# Patient Record
Sex: FEMALE | Race: WHITE | URBAN - METROPOLITAN AREA
[De-identification: names, ages, dates, MRNs, and addresses within clinical notes are randomized per-mention and may not be internally consistent; named-entity substitution may affect disease eponyms.]

---

## 2024-03-04 ENCOUNTER — APPOINTMENT (RX ONLY)
Dept: URBAN - METROPOLITAN AREA CLINIC 84 | Facility: CLINIC | Age: 69
Setting detail: DERMATOLOGY
End: 2024-03-04

## 2024-03-04 DIAGNOSIS — D485 NEOPLASM OF UNCERTAIN BEHAVIOR OF SKIN: ICD-10-CM

## 2024-03-04 DIAGNOSIS — L21.8 OTHER SEBORRHEIC DERMATITIS: ICD-10-CM

## 2024-03-04 PROBLEM — D48.5 NEOPLASM OF UNCERTAIN BEHAVIOR OF SKIN: Status: ACTIVE | Noted: 2024-03-04

## 2024-03-04 PROCEDURE — ? PRESCRIPTION

## 2024-03-04 PROCEDURE — ? ADDITIONAL NOTES

## 2024-03-04 PROCEDURE — ? PRESCRIPTION MEDICATION MANAGEMENT

## 2024-03-04 PROCEDURE — ? COUNSELING

## 2024-03-04 PROCEDURE — 99204 OFFICE O/P NEW MOD 45 MIN: CPT | Mod: 25

## 2024-03-04 PROCEDURE — 11102 TANGNTL BX SKIN SINGLE LES: CPT

## 2024-03-04 PROCEDURE — ? BIOPSY BY SHAVE METHOD

## 2024-03-04 RX ORDER — CLOBETASOL PROPIONATE 0.5 MG/G
CREAM TOPICAL
Qty: 45 | Refills: 4 | Status: ERX | COMMUNITY
Start: 2024-03-04

## 2024-03-04 RX ADMIN — CLOBETASOL PROPIONATE: 0.5 CREAM TOPICAL at 00:00

## 2024-03-04 ASSESSMENT — LOCATION DETAILED DESCRIPTION DERM: LOCATION DETAILED: LEFT SUPERIOR PARIETAL SCALP

## 2024-03-04 ASSESSMENT — LOCATION SIMPLE DESCRIPTION DERM: LOCATION SIMPLE: SCALP

## 2024-03-04 ASSESSMENT — LOCATION ZONE DERM: LOCATION ZONE: SCALP

## 2024-03-04 NOTE — PROCEDURE: BIOPSY BY SHAVE METHOD
Detail Level: Detailed
Depth Of Biopsy: dermis
Was A Bandage Applied: Yes
Size Of Lesion In Cm: 0.8
X Size Of Lesion In Cm: 0
Biopsy Type: H and E
Biopsy Method: Dermablade
Anesthesia Type: 1% lidocaine with epinephrine
Anesthesia Volume In Cc: 0.5
Hemostasis: Drysol
Wound Care: Petrolatum
Dressing: bandage
normal
Destruction After The Procedure: No
Type Of Destruction Used: Curettage
Curettage Text: The wound bed was treated with curettage after the biopsy was performed.
Cryotherapy Text: The wound bed was treated with cryotherapy after the biopsy was performed.
Electrodesiccation Text: The wound bed was treated with electrodesiccation after the biopsy was performed.
Electrodesiccation And Curettage Text: The wound bed was treated with electrodesiccation and curettage after the biopsy was performed.
Silver Nitrate Text: The wound bed was treated with silver nitrate after the biopsy was performed.
Lab: [id number]
Consent: Written consent was obtained and risks were reviewed including but not limited to scarring, infection, bleeding, scabbing, incomplete removal, nerve damage and allergy to anesthesia.
Post-Care Instructions: I reviewed with the patient in detail post-care instructions. Patient is to keep the biopsy site dry overnight, and then apply bacitracin twice daily until healed. Patient may apply hydrogen peroxide soaks to remove any crusting.
Notification Instructions: Patient will be notified of biopsy results. However, patient instructed to call the office if not contacted within 2 weeks.
Billing Type: Third-Party Bill
Information: Selecting Yes will display possible errors in your note based on the variables you have selected. This validation is only offered as a suggestion for you. PLEASE NOTE THAT THE VALIDATION TEXT WILL BE REMOVED WHEN YOU FINALIZE YOUR NOTE. IF YOU WANT TO FAX A PRELIMINARY NOTE YOU WILL NEED TO TOGGLE THIS TO 'NO' IF YOU DO NOT WANT IT IN YOUR FAXED NOTE.

## 2024-03-04 NOTE — PROCEDURE: PRESCRIPTION MEDICATION MANAGEMENT
Plan: Continue the use of vanicream anti dandruff shampoo
Detail Level: Zone
Initiate Treatment: Clobetasol cream, twice daily for two weeks patient to use [facility]
Render In Strict Bullet Format?: No

## 2024-03-04 NOTE — HPI: BODY LOCATION - SCALP
How Severe Is Your Condition?: moderate
Additional History: Patient has tried and failed vanicream, ketoconazole,clobetasol, coal tar , psoriasis shampoo

## 2024-03-04 NOTE — PROCEDURE: ADDITIONAL NOTES
Render Risk Assessment In Note?: no
Detail Level: Simple
Additional Notes: Patient has tried and failed ketoconazole clobetasol solution, doxycycline,vanicream,coal tar shampoo, selsum blue\\n\\nAdvised concerns have been present off and on for  multiple years \\nPatient to stop picking lesions \\nInjections in the past, offered today and declined TAC injections

## 2024-04-22 ENCOUNTER — APPOINTMENT (RX ONLY)
Dept: URBAN - METROPOLITAN AREA CLINIC 84 | Facility: CLINIC | Age: 69
Setting detail: DERMATOLOGY
End: 2024-04-22

## 2024-04-22 DIAGNOSIS — L21.8 OTHER SEBORRHEIC DERMATITIS: ICD-10-CM

## 2024-04-22 PROCEDURE — ? PRESCRIPTION MEDICATION MANAGEMENT

## 2024-04-22 PROCEDURE — ? ADDITIONAL NOTES

## 2024-04-22 PROCEDURE — ? PRESCRIPTION

## 2024-04-22 PROCEDURE — ? COUNSELING

## 2024-04-22 PROCEDURE — 99214 OFFICE O/P EST MOD 30 MIN: CPT

## 2024-04-22 RX ORDER — KETOCONAZOLE 20 MG/ML
SHAMPOO, SUSPENSION TOPICAL BIW
Qty: 120 | Refills: 11 | Status: ERX | COMMUNITY
Start: 2024-04-22

## 2024-04-22 RX ORDER — CLOBETASOL PROPIONATE 0.5 MG/G
CREAM TOPICAL
Qty: 60 | Refills: 0 | Status: ERX

## 2024-04-22 RX ORDER — FLUCONAZOLE 100 MG/1
TABLET ORAL
Qty: 14 | Refills: 0 | Status: ERX | COMMUNITY
Start: 2024-04-22

## 2024-04-22 RX ADMIN — KETOCONAZOLE: 20 SHAMPOO, SUSPENSION TOPICAL at 00:00

## 2024-04-22 RX ADMIN — FLUCONAZOLE: 100 TABLET ORAL at 00:00

## 2024-04-22 NOTE — PROCEDURE: ADDITIONAL NOTES
Render Risk Assessment In Note?: no
Detail Level: Simple
Additional Notes: 3/4/24 Patient has tried and failed ketoconazole clobetasol solution, doxycycline,vanicream,coal tar shampoo, selsum blue\\n\\nAdvised concerns have been present off and on for  multiple years \\nPatient to stop picking lesions \\nInjections in the past, offered today and declined TAC injections\\n\\n\\n4/22/24 pt stated that she will contact her pharmacist to discuss how long does the amtriplyine stays in her system. Pt stated at todays office visit she will not take the prescription to until her pharmacist okayed it.\\n\\n\\nPatient stated that she was not going to take her\\n\\n\\nPatient acknowledges understanding that we cannot prescribe fluconazole without her stopping amitriptyline and diclofenac. Patient stated that she has not taken any of those two medication's in about a week and will not take any of those medication's for the upcoming month in order to finish her 2 week regimen of the fluconazole to treat seborrheic dermatitis. We explain to the patient that they were contraindications of possible seizure if she mixes those two medication's that were explain to her, patient acknowledged understanding, and agreed to discontinue both for the foreseeable future. Patient acknowledges understanding that we cannot prescribe fluconazole without her stopping amitriptyline and diclofenac. Patient stated that she has not taken any of those two medication's in about a week and will not take any of those medication's for the upcoming month in order to finish her 2 week regimen of the fluconazole to treat seborrheic dermatitis. We explain to the patient that they were contraindications of possible seizure if she mixes those two medication's that were explain to her, patient acknowledged understanding, and agreed to discontinue both for the foreseeable future.

## 2024-06-03 ENCOUNTER — APPOINTMENT (RX ONLY)
Dept: URBAN - METROPOLITAN AREA CLINIC 84 | Facility: CLINIC | Age: 69
Setting detail: DERMATOLOGY
End: 2024-06-03

## 2024-06-03 DIAGNOSIS — L21.8 OTHER SEBORRHEIC DERMATITIS: ICD-10-CM

## 2024-06-03 PROCEDURE — ? ADDITIONAL NOTES

## 2024-06-03 PROCEDURE — 99214 OFFICE O/P EST MOD 30 MIN: CPT

## 2024-06-03 PROCEDURE — ? PRESCRIPTION

## 2024-06-03 PROCEDURE — ? COUNSELING

## 2024-06-03 PROCEDURE — ? PRESCRIPTION MEDICATION MANAGEMENT

## 2024-06-03 RX ORDER — CICLOPIROX 10 MG/.96ML
SHAMPOO TOPICAL
Qty: 120 | Refills: 6 | Status: ERX | COMMUNITY
Start: 2024-06-03

## 2024-06-03 RX ORDER — CICLOPIROX OLAMINE 7.7 MG/G
CREAM TOPICAL
Qty: 90 | Refills: 4 | Status: ERX | COMMUNITY
Start: 2024-06-03

## 2024-06-03 RX ADMIN — CICLOPIROX OLAMINE: 7.7 CREAM TOPICAL at 00:00

## 2024-06-03 RX ADMIN — CICLOPIROX: 10 SHAMPOO TOPICAL at 00:00

## 2024-06-03 ASSESSMENT — LOCATION SIMPLE DESCRIPTION DERM: LOCATION SIMPLE: ABDOMEN

## 2024-06-03 ASSESSMENT — LOCATION ZONE DERM: LOCATION ZONE: TRUNK

## 2024-06-03 ASSESSMENT — LOCATION DETAILED DESCRIPTION DERM: LOCATION DETAILED: EPIGASTRIC SKIN

## 2024-06-03 NOTE — PROCEDURE: ADDITIONAL NOTES
Render Risk Assessment In Note?: no
Detail Level: Simple
Additional Notes: 3/4/24 Patient has tried and failed ketoconazole clobetasol solution, doxycycline,vanicream,coal tar shampoo, selsum blue\\n\\nAdvised concerns have been present off and on for  multiple years \\nPatient to stop picking lesions \\nInjections in the past, offered today and declined TAC injections\\n\\n\\n4/22/24 pt stated that she will contact her pharmacist to discuss how long does the amtriplyine stays in her system. Pt stated at todays office visit she will not take the prescription to until her pharmacist okayed it.\\n\\n\\n\\n\\n\\nPatient acknowledges understanding that we cannot prescribe fluconazole without her stopping amitriptyline and diclofenac. Patient stated that she has not taken any of those two medication's in about a week and will not take any of those medication's for the upcoming month in order to finish her 2 week regimen of the fluconazole to treat seborrheic dermatitis. We explain to the patient that they were contraindications of possible seizure if she mixes those two medication's that were explain to her, patient acknowledged understanding, and agreed to discontinue both for the foreseeable future. Patient acknowledges understanding that we cannot prescribe fluconazole without her stopping amitriptyline and diclofenac. Patient stated that she has not taken any of those two medication's in about a week and will not take any of those medication's for the upcoming month in order to finish her 2 week regimen of the fluconazole to treat seborrheic dermatitis. We explain to the patient that they were contraindications of possible seizure if she mixes those two medication's that were explain to her, patient acknowledged understanding, and agreed to discontinue both for the foreseeable future.\\n\\n\\n6/3/24- discussed treatment is not currently working, she does not like ketoconazole shampoo and is using her otc shampoo. Rx ciclopirox shampoo sent today

## 2024-06-03 NOTE — PROCEDURE: PRESCRIPTION MEDICATION MANAGEMENT
Plan: Continue the use of vanicream anti dandruff shampoo\\nPatient reports ketoconazole shampoo does not help and makes it worse
Detail Level: Zone
Continue Regimen: Clobetasol cream, twice daily for two weeks patient to use [facility] coupon\\nPeritherone zinc otc shampoos
Render In Strict Bullet Format?: No